# Patient Record
Sex: MALE | Race: WHITE | NOT HISPANIC OR LATINO | Employment: FULL TIME | URBAN - NONMETROPOLITAN AREA
[De-identification: names, ages, dates, MRNs, and addresses within clinical notes are randomized per-mention and may not be internally consistent; named-entity substitution may affect disease eponyms.]

---

## 2017-01-25 RX ORDER — DICLOFENAC SODIUM 25 MG/1
TABLET, DELAYED RELEASE ORAL
Qty: 60 TABLET | Refills: 0 | Status: SHIPPED | OUTPATIENT
Start: 2017-01-25 | End: 2017-03-09 | Stop reason: DRUGHIGH

## 2017-02-02 ENCOUNTER — OFFICE VISIT (OUTPATIENT)
Dept: FAMILY MEDICINE CLINIC | Facility: CLINIC | Age: 42
End: 2017-02-02

## 2017-02-02 ENCOUNTER — LAB (OUTPATIENT)
Dept: LAB | Facility: HOSPITAL | Age: 42
End: 2017-02-02

## 2017-02-02 VITALS
BODY MASS INDEX: 26.36 KG/M2 | HEIGHT: 69 IN | DIASTOLIC BLOOD PRESSURE: 92 MMHG | SYSTOLIC BLOOD PRESSURE: 130 MMHG | WEIGHT: 178 LBS

## 2017-02-02 DIAGNOSIS — R53.83 MALAISE AND FATIGUE: ICD-10-CM

## 2017-02-02 DIAGNOSIS — M25.40 JOINT SWELLING: ICD-10-CM

## 2017-02-02 DIAGNOSIS — M25.50 ARTHRALGIA, UNSPECIFIED JOINT: ICD-10-CM

## 2017-02-02 DIAGNOSIS — L40.9 PSORIASIS: ICD-10-CM

## 2017-02-02 DIAGNOSIS — R53.81 MALAISE AND FATIGUE: ICD-10-CM

## 2017-02-02 DIAGNOSIS — M25.50 ARTHRALGIA, UNSPECIFIED JOINT: Primary | ICD-10-CM

## 2017-02-02 LAB
CRP SERPL-MCNC: 1.4 MG/DL (ref 0–1)
ERYTHROCYTE [SEDIMENTATION RATE] IN BLOOD: 21 MM/HR (ref 0–20)
URATE SERPL-MCNC: 6.3 MG/DL (ref 2.5–8.5)

## 2017-02-02 PROCEDURE — 36415 COLL VENOUS BLD VENIPUNCTURE: CPT | Performed by: NURSE PRACTITIONER

## 2017-02-02 PROCEDURE — 86431 RHEUMATOID FACTOR QUANT: CPT | Performed by: NURSE PRACTITIONER

## 2017-02-02 PROCEDURE — 99213 OFFICE O/P EST LOW 20 MIN: CPT | Performed by: NURSE PRACTITIONER

## 2017-02-02 PROCEDURE — 86038 ANTINUCLEAR ANTIBODIES: CPT | Performed by: NURSE PRACTITIONER

## 2017-02-02 PROCEDURE — 86757 RICKETTSIA ANTIBODY: CPT | Performed by: NURSE PRACTITIONER

## 2017-02-02 PROCEDURE — 84550 ASSAY OF BLOOD/URIC ACID: CPT | Performed by: NURSE PRACTITIONER

## 2017-02-02 PROCEDURE — 85651 RBC SED RATE NONAUTOMATED: CPT | Performed by: NURSE PRACTITIONER

## 2017-02-02 PROCEDURE — 86140 C-REACTIVE PROTEIN: CPT | Performed by: NURSE PRACTITIONER

## 2017-02-02 RX ORDER — METHYLPREDNISOLONE 4 MG/1
TABLET ORAL
Qty: 21 TABLET | Refills: 0 | Status: SHIPPED | OUTPATIENT
Start: 2017-02-02 | End: 2017-03-09

## 2017-02-02 NOTE — PROGRESS NOTES
Chief Complaint   Patient presents with   • Knee Pain     bilateral knee pain x 1 week , swelling right knee   • Elbow Pain     bilateral elbow pain x 1 week     Subjective   Boubacar Baez is a 41 y.o. male.     Knee Pain    The incident occurred more than 1 week ago. There was no injury mechanism. The pain is present in the right knee. The quality of the pain is described as aching. The pain is at a severity of 10/10. The pain is severe. The pain has been constant since onset. Pertinent negatives include no inability to bear weight, loss of motion, loss of sensation, muscle weakness, numbness or tingling. He reports no foreign bodies present. Nothing aggravates the symptoms. He has tried acetaminophen for the symptoms. The treatment provided mild relief.   Elbow Pain   This is a new problem. The current episode started 1 to 4 weeks ago. The problem occurs constantly. The problem has been gradually worsening. Associated symptoms include arthralgias, fatigue, joint swelling, myalgias, neck pain and a rash. Pertinent negatives include no abdominal pain, anorexia, chest pain, chills, congestion, coughing, diaphoresis, fever, headaches, nausea, numbness, urinary symptoms, vertigo, visual change, vomiting or weakness. The symptoms are aggravated by walking, exertion and standing. He has tried acetaminophen for the symptoms. The treatment provided mild relief.        The following portions of the patient's history were reviewed and updated as appropriate: allergies, current medications, past social history and problem list.    Review of Systems   Constitutional: Positive for fatigue. Negative for chills, diaphoresis and fever.   HENT: Negative.  Negative for congestion.    Eyes: Negative.  Negative for pain, discharge, redness and itching.   Respiratory: Negative.  Negative for cough.    Cardiovascular: Negative.  Negative for chest pain.   Gastrointestinal: Negative.  Negative for abdominal pain, anorexia, nausea  "and vomiting.   Endocrine: Negative.    Genitourinary: Negative.    Musculoskeletal: Positive for arthralgias, back pain, gait problem, joint swelling, myalgias, neck pain and neck stiffness.   Skin: Positive for rash.        Rash to elbows and bilateral knees    Allergic/Immunologic: Negative.    Neurological: Negative for dizziness, vertigo, tingling, facial asymmetry, weakness, numbness and headaches.   Hematological: Negative.    Psychiatric/Behavioral: Negative.        Objective   Visit Vitals   • /92 (BP Location: Left arm, Patient Position: Sitting, Cuff Size: Adult)   • Ht 69\" (175.3 cm)   • Wt 178 lb (80.7 kg)   • BMI 26.29 kg/m2     Physical Exam   Constitutional: He is oriented to person, place, and time. He appears well-developed and well-nourished.   HENT:   Head: Normocephalic.   Eyes: EOM are normal. Pupils are equal, round, and reactive to light.   Neck: Normal range of motion.   Cardiovascular: Normal rate, regular rhythm and normal heart sounds.    Pulmonary/Chest: Effort normal and breath sounds normal.   Abdominal: Soft.   Genitourinary: Rectum normal and penis normal.   Musculoskeletal:        Right elbow: He exhibits swelling. Tenderness found.        Left elbow: He exhibits swelling. Tenderness found.        Right knee: He exhibits decreased range of motion, swelling and bony tenderness.        Left knee: He exhibits decreased range of motion, swelling and bony tenderness. Tenderness found.   Neurological: He is alert and oriented to person, place, and time.   Skin: Skin is warm, dry and intact. Rash noted. No abrasion, no bruising, no burn, no ecchymosis, no laceration, no lesion, no petechiae and no purpura noted. Rash is papular and maculopapular. Rash is not macular, not nodular, not pustular, not vesicular and not urticarial. He is not diaphoretic. No cyanosis or erythema. No pallor. Nails show no clubbing.   Rash to both knees red with plague    Psychiatric: He has a normal mood " and affect.       Assessment/Plan   Problem List Items Addressed This Visit        Nervous and Auditory    Arthralgia - Primary    Relevant Orders    MELANY    C-reactive Protein    Rheumatoid Factor, Quant    Uric Acid    Sedimentation Rate    Rheumatoid Factor    Lyme IgG / IgM Ab    [facility] (IgG / M)       Musculoskeletal and Integument    Psoriasis    Joint swelling    Relevant Orders    MELANY    C-reactive Protein    Rheumatoid Factor, Quant    Uric Acid    Sedimentation Rate    Rheumatoid Factor    Lyme IgG / IgM Ab    [facility] (IgG / M)       Other    Malaise and fatigue    Relevant Orders    MELANY    C-reactive Protein    Rheumatoid Factor, Quant    Uric Acid    Sedimentation Rate    Rheumatoid Factor    Lyme IgG / IgM Ab    [facility] (IgG / M)           New Medications Ordered This Visit   Medications   • MethylPREDNISolone (MEDROL, ROSALINO,) 4 MG tablet     Sig: Take as directed on package instructions.     Dispense:  21 tablet     Refill:  0   • diclofenac (VOLTAREN) 50 MG EC tablet     Sig: Take 1 tablet by mouth 3 (Three) Times a Day.     Dispense:  90 tablet     Refill:  5

## 2017-02-03 LAB — RHEUMATOID FACT SERPL-ACNC: NEGATIVE [IU]/ML

## 2017-02-04 LAB — RA LATEX TURBID: <10 IU/ML (ref 0–13.9)

## 2017-02-06 LAB — ANA SER QL: NEGATIVE

## 2017-02-08 LAB
R RICKETTSI IGG SER QL IA: ABNORMAL
R RICKETTSI IGM TITR SER: 0.86 INDEX (ref 0–0.89)
RICK SF IGG TITR SER IF: POSITIVE {TITER}

## 2017-02-09 ENCOUNTER — TELEPHONE (OUTPATIENT)
Dept: FAMILY MEDICINE CLINIC | Facility: CLINIC | Age: 42
End: 2017-02-09

## 2017-02-09 DIAGNOSIS — M25.59 PAIN IN JOINT, OTHER SPECIFIED SITES: ICD-10-CM

## 2017-02-09 DIAGNOSIS — L40.9 PSORIASIS: ICD-10-CM

## 2017-02-09 DIAGNOSIS — R53.81 MALAISE AND FATIGUE: Primary | ICD-10-CM

## 2017-02-09 DIAGNOSIS — M25.40 EFFUSION OF JOINT, SITE UNSPECIFIED: ICD-10-CM

## 2017-02-09 DIAGNOSIS — R53.83 MALAISE AND FATIGUE: Primary | ICD-10-CM

## 2017-02-09 RX ORDER — DOXYCYCLINE HYCLATE 100 MG/1
100 TABLET, DELAYED RELEASE ORAL 2 TIMES DAILY
Qty: 28 TABLET | Refills: 0 | Status: SHIPPED | OUTPATIENT
Start: 2017-02-09 | End: 2017-04-03 | Stop reason: SDUPTHER

## 2017-02-09 NOTE — PROGRESS NOTES
RESULTS & RECOMMENDATIONS RELAYED Pr ANDREW Ace's instruction  Mr. Baez was contacted and advised his RMSF test was Positive.  He states he had tested Positive in the past.   Based on his symptoms Miroslava feels he needs to be treated with Doxycyline 100 mg BID.  Referral was sent to Rheumatology in [facility].

## 2017-02-09 NOTE — TELEPHONE ENCOUNTER
ANDREW Bateman's instruction  Mr. Baez was contacted and advised his RMSF test was Positive.  He states he had tested Positive in the past.   Based on his symptoms Miroslava feels he needs to be treated with Doxycyline 100 mg BID.  Referral was sent to Rheumatology in [facility].     ---- Message from ANDREW Jones sent at 2/8/2017  4:40 PM CST -----  I cannot get a hold of this patient-can you see if you can reach him-needs to be treated with doxycycline 100mg bid x 14 days pos previous exposure to rmsf but going to treat based on his symptoms everything else looks good-I still want him to see rheumatologist

## 2017-03-09 ENCOUNTER — OFFICE VISIT (OUTPATIENT)
Dept: FAMILY MEDICINE CLINIC | Facility: CLINIC | Age: 42
End: 2017-03-09

## 2017-03-09 VITALS
WEIGHT: 165 LBS | HEIGHT: 69 IN | SYSTOLIC BLOOD PRESSURE: 152 MMHG | DIASTOLIC BLOOD PRESSURE: 100 MMHG | BODY MASS INDEX: 24.44 KG/M2

## 2017-03-09 DIAGNOSIS — M25.50 ARTHRALGIA, UNSPECIFIED JOINT: Primary | ICD-10-CM

## 2017-03-09 DIAGNOSIS — M25.562 PAIN IN BOTH KNEES, UNSPECIFIED CHRONICITY: ICD-10-CM

## 2017-03-09 DIAGNOSIS — M25.40 JOINT SWELLING: ICD-10-CM

## 2017-03-09 DIAGNOSIS — M25.561 PAIN IN BOTH KNEES, UNSPECIFIED CHRONICITY: ICD-10-CM

## 2017-03-09 PROCEDURE — 99214 OFFICE O/P EST MOD 30 MIN: CPT | Performed by: NURSE PRACTITIONER

## 2017-03-09 RX ORDER — METHYLPREDNISOLONE 4 MG/1
TABLET ORAL
Qty: 21 TABLET | Refills: 0 | Status: SHIPPED | OUTPATIENT
Start: 2017-03-09

## 2017-03-09 NOTE — PROGRESS NOTES
Chief Complaint   Patient presents with   • Follow-up     rmf    • Joint Swelling   • Knee Pain     Subjective   Boubacar Baez is a 41 y.o. male.     Joint Swelling   Associated symptoms include arthralgias, fatigue, joint swelling, myalgias, neck pain and a rash. Pertinent negatives include no abdominal pain, chest pain, chills, congestion, coughing, diaphoresis, fever, headaches, nausea, numbness, urinary symptoms, visual change, vomiting or weakness.   Knee Pain    The incident occurred more than 1 week ago. There was no injury mechanism. The pain is present in the right knee. The quality of the pain is described as aching. The pain is at a severity of 10/10. The pain is severe. The pain has been constant since onset. Pertinent negatives include no inability to bear weight, loss of motion, loss of sensation, muscle weakness, numbness or tingling. He reports no foreign bodies present. Nothing aggravates the symptoms. He has tried acetaminophen for the symptoms. The treatment provided mild relief.   Elbow Pain   This is a new problem. The current episode started 1 to 4 weeks ago. The problem occurs constantly. The problem has been gradually worsening. Associated symptoms include arthralgias, fatigue, joint swelling, myalgias, neck pain and a rash. Pertinent negatives include no abdominal pain, chest pain, chills, congestion, coughing, diaphoresis, fever, headaches, nausea, numbness, urinary symptoms, visual change, vomiting or weakness. The symptoms are aggravated by walking, exertion and standing. He has tried acetaminophen for the symptoms. The treatment provided mild relief.        The following portions of the patient's history were reviewed and updated as appropriate: allergies, current medications, past social history and problem list.    Review of Systems   Constitutional: Positive for fatigue. Negative for chills, diaphoresis and fever.   HENT: Negative.  Negative for congestion.    Eyes: Negative.   "Negative for pain, discharge, redness and itching.   Respiratory: Negative.  Negative for cough.    Cardiovascular: Negative.  Negative for chest pain.   Gastrointestinal: Negative.  Negative for abdominal pain, nausea and vomiting.   Endocrine: Negative.    Genitourinary: Negative.    Musculoskeletal: Positive for arthralgias, back pain, gait problem, joint swelling, myalgias, neck pain and neck stiffness.   Skin: Positive for rash.        Rash to elbows and bilateral knees    Allergic/Immunologic: Negative.    Neurological: Negative for dizziness, tingling, facial asymmetry, weakness, numbness and headaches.   Hematological: Negative.    Psychiatric/Behavioral: Negative.        Objective   Visit Vitals   • /100 (BP Location: Left arm, Patient Position: Sitting, Cuff Size: Adult)   • Ht 69\" (175.3 cm)   • Wt 165 lb (74.8 kg)   • BMI 24.37 kg/m2     Physical Exam   Constitutional: He is oriented to person, place, and time. He appears well-developed and well-nourished.   HENT:   Head: Normocephalic.   Eyes: EOM are normal. Pupils are equal, round, and reactive to light.   Neck: Normal range of motion.   Cardiovascular: Normal rate, regular rhythm and normal heart sounds.    Pulmonary/Chest: Effort normal and breath sounds normal.   Abdominal: Soft.   Genitourinary: Rectum normal and penis normal.   Musculoskeletal: He exhibits tenderness. He exhibits no edema or deformity.        Right shoulder: He exhibits decreased range of motion, tenderness, bony tenderness, swelling and crepitus.        Right elbow: He exhibits swelling. Tenderness found.        Left elbow: He exhibits swelling. Tenderness found.        Right knee: He exhibits decreased range of motion, swelling and bony tenderness.        Left knee: He exhibits decreased range of motion, swelling and bony tenderness. Tenderness found.   Neurological: He is alert and oriented to person, place, and time. He displays normal reflexes. No cranial nerve " deficit. He exhibits normal muscle tone. Coordination normal.   Skin: Skin is warm, dry and intact. Rash noted. No abrasion, no bruising, no burn, no ecchymosis, no laceration, no lesion, no petechiae and no purpura noted. Rash is papular and maculopapular. Rash is not macular, not nodular, not pustular, not vesicular and not urticarial. He is not diaphoretic. No cyanosis or erythema. No pallor. Nails show no clubbing.   Rash to both knees red with plague    Psychiatric: He has a normal mood and affect.       Assessment/Plan   Problem List Items Addressed This Visit        Nervous and Auditory    Arthralgia - Primary    Relevant Orders    XR Knee 4+ View Left    Ambulatory Referral to Orthopedic Surgery       Musculoskeletal and Integument    Joint swelling    Relevant Orders    XR Knee 4+ View Left    Ambulatory Referral to Orthopedic Surgery      Other Visit Diagnoses     Pain in both knees, unspecified chronicity        Relevant Orders    XR Knee 4+ View Left    XR Knee 4+ View Right    Ambulatory Referral to Orthopedic Surgery           New Medications Ordered This Visit   Medications   • MethylPREDNISolone (MEDROL, ROSALINO,) 4 MG tablet     Sig: Take as directed on package instructions.     Dispense:  21 tablet     Refill:  0

## 2017-03-10 DIAGNOSIS — M25.562 CHRONIC PAIN OF BOTH KNEES: Primary | ICD-10-CM

## 2017-03-10 DIAGNOSIS — M25.561 CHRONIC PAIN OF BOTH KNEES: Primary | ICD-10-CM

## 2017-03-10 DIAGNOSIS — G89.29 CHRONIC PAIN OF BOTH KNEES: Primary | ICD-10-CM

## 2017-03-27 ENCOUNTER — OFFICE VISIT (OUTPATIENT)
Dept: ORTHOPEDIC SURGERY | Facility: CLINIC | Age: 42
End: 2017-03-27

## 2017-03-27 VITALS — BODY MASS INDEX: 24.44 KG/M2 | WEIGHT: 165 LBS | HEIGHT: 69 IN

## 2017-03-27 DIAGNOSIS — G89.29 CHRONIC PAIN OF BOTH KNEES: Primary | ICD-10-CM

## 2017-03-27 DIAGNOSIS — M25.562 CHRONIC PAIN OF BOTH KNEES: Primary | ICD-10-CM

## 2017-03-27 DIAGNOSIS — L40.50 PSORIATIC ARTHRITIS (HCC): ICD-10-CM

## 2017-03-27 DIAGNOSIS — M25.561 CHRONIC PAIN OF BOTH KNEES: Primary | ICD-10-CM

## 2017-03-27 PROCEDURE — 99214 OFFICE O/P EST MOD 30 MIN: CPT | Performed by: NURSE PRACTITIONER

## 2017-03-27 NOTE — PROGRESS NOTES
Boubacar Baez is a 41 y.o. male   Primary provider:  ANDREW Jones       Chief Complaint   Patient presents with   • Left Knee - Establish Care, Pain   • Right Knee - Establish Care, Pain       HISTORY OF PRESENT ILLNESS:     Pain   This is a chronic problem. The current episode started more than 1 month ago. The problem occurs constantly. The problem has been unchanged. Associated symptoms include joint swelling and weakness. Associated symptoms comments: Sharp pain with dull ache, and locking. The symptoms are aggravated by bending, walking and twisting (squatting). He has tried immobilization, heat and ice for the symptoms. The treatment provided mild relief.        CONCURRENT MEDICAL HISTORY:    Past Medical History:   Diagnosis Date   • Pain in finger of left hand 07/20/2016   • Psoriasis 07/20/2016   • Wax in ear 07/20/2016       Allergies   Allergen Reactions   • Tylenol [Acetaminophen]          Current Outpatient Prescriptions:   •  betamethasone valerate (VALISONE) 0.1 % ointment, Apply  topically 2 (two) times a day., Disp: , Rfl:   •  diclofenac (VOLTAREN) 50 MG EC tablet, Take 1 tablet by mouth 3 (Three) Times a Day., Disp: 90 tablet, Rfl: 5  •  doxycycline (DORYX) 100 MG enteric coated tablet, Take 1 tablet by mouth 2 (Two) Times a Day., Disp: 28 tablet, Rfl: 0  •  MethylPREDNISolone (MEDROL, ROSALINO,) 4 MG tablet, Take as directed on package instructions., Disp: 21 tablet, Rfl: 0    No past surgical history on file.    Family History   Problem Relation Age of Onset   • Hypertension Other        Social History     Social History   • Marital status:      Spouse name: N/A   • Number of children: N/A   • Years of education: N/A     Occupational History   • Not on file.     Social History Main Topics   • Smoking status: Never Smoker   • Smokeless tobacco: Current User   • Alcohol use Yes      Comment: BEER   • Drug use: Not on file   • Sexual activity: Not on file     Other Topics Concern   •  "Not on file     Social History Narrative        Review of Systems   Constitutional: Positive for unexpected weight change.   Eyes: Positive for photophobia and visual disturbance.   Musculoskeletal: Positive for back pain and joint swelling.        Pain and stiffness in joints   Neurological: Positive for weakness.   All other systems reviewed and are negative.      PHYSICAL EXAMINATION:       Ht 69\" (175.3 cm)  Wt 165 lb (74.8 kg)  BMI 24.37 kg/m2    Physical Exam   Constitutional: He is oriented to person, place, and time. Vital signs are normal. He appears well-developed and well-nourished. He is cooperative.   HENT:   Head: Normocephalic and atraumatic.   Neck: Trachea normal and phonation normal.   Pulmonary/Chest: Effort normal. No respiratory distress.   Abdominal: Soft. Normal appearance. He exhibits no distension.   Musculoskeletal:        Right knee: He exhibits effusion.        Left knee: He exhibits effusion.   Neurological: He is alert and oriented to person, place, and time. GCS eye subscore is 4. GCS verbal subscore is 5. GCS motor subscore is 6.   Skin: Skin is warm, dry and intact.   Psychiatric: He has a normal mood and affect. His speech is normal and behavior is normal. Judgment and thought content normal. Cognition and memory are normal.   Vitals reviewed.      GAIT:     [x]  Normal  []  Antalgic    Assistive device: [x]  None  []  Walker     []  Crutches  []  Cane     []  Wheelchair  []  Stretcher    Right Knee Exam     Tenderness   The patient is experiencing tenderness in the medial joint line and lateral joint line.    Range of Motion   Extension: normal   Flexion: abnormal     Other   Erythema: absent  Scars: absent  Sensation: normal  Pulse: present  Swelling: mild  Other tests: effusion present      Left Knee Exam     Tenderness   The patient is experiencing tenderness in the medial joint line and lateral joint line.    Range of Motion   Extension: normal   Flexion: abnormal     Other "   Erythema: absent  Scars: absent  Sensation: normal  Pulse: present  Swelling: mild  Effusion: effusion present                  Xr Knee 4+ View Left    Result Date: 3/9/2017  Narrative: Patient Name:  SILVIO FERNANDEZ Patient ID:  [id number] Ordering:  CARMEL DIAZ Attending:  CARMEL DIAZ Referring:  CARMEL DIAZ ------------------------------------------------ Procedure:  Left knee.    Indication:  Left knee pain. Effusion   . Technique:  Left knee May 15, 2014.   . Prior relevant exam:  None.   No evidence of acute bony, soft tissue, or joint abnormality is noted. Bone mineralization is within normal limits. The visualized joint spaces appear intact.     Impression: CONCLUSION: 1.  Normal left knee.   Electronically signed by:  Justin Sosa MD  3/9/2017 9:13 AM [employer] Workstation: [id number]    Xr Knee 4+ View Right    Result Date: 3/9/2017  Narrative: Patient Name:  SILVIO FERNANDEZ Patient ID:  [id number] Ordering:  CARMEL DIAZ Attending:  CARMEL DIAZ Referring:  CARMEL DIAZ ------------------------------------------------ Procedure:  Right knee    Indication:  Right knee pain.   . Technique:  Four views   . Prior relevant exam:  None.   No evidence of acute bony, soft tissue, or joint abnormality is noted. Bone mineralization is within normal limits. The visualized joint spaces appear intact. Focal sclerotic lesion proximal tibia, incidental benign bone island.     Impression: CONCLUSION: 1.  Normal right knee.   Electronically signed by:  Justin Sosa MD  3/9/2017 9:12 AM [employer] Workstation: [id number]          ASSESSMENT:    Diagnoses and all orders for this visit:    Chronic pain of both knees    Psoriatic arthritis          PLAN  I have resent referral to rheumatology today and recommended follow-up with a rheumatologist for further evaluation of his psoriatic arthritis and recent diagnosis RMSF.      No Follow-up on file.    Lennox Rosario, APRN

## 2017-03-28 PROBLEM — L40.9 PSORIASIS: Status: RESOLVED | Noted: 2017-02-02 | Resolved: 2017-03-28

## 2017-03-30 ENCOUNTER — OFFICE VISIT (OUTPATIENT)
Dept: FAMILY MEDICINE CLINIC | Facility: CLINIC | Age: 42
End: 2017-03-30

## 2017-03-30 VITALS
OXYGEN SATURATION: 99 % | HEART RATE: 95 BPM | WEIGHT: 165.4 LBS | DIASTOLIC BLOOD PRESSURE: 100 MMHG | TEMPERATURE: 97.8 F | SYSTOLIC BLOOD PRESSURE: 144 MMHG | HEIGHT: 68 IN | BODY MASS INDEX: 25.07 KG/M2

## 2017-03-30 DIAGNOSIS — A77.0 ROCKY MOUNTAIN SPOTTED FEVER: ICD-10-CM

## 2017-03-30 DIAGNOSIS — L40.50 PSORIATIC ARTHRITIS (HCC): Primary | ICD-10-CM

## 2017-03-30 DIAGNOSIS — J30.2 SEASONAL ALLERGIC RHINITIS, UNSPECIFIED ALLERGIC RHINITIS TRIGGER: ICD-10-CM

## 2017-03-30 LAB
ALBUMIN SERPL-MCNC: 4.3 G/DL (ref 3.4–4.8)
ALBUMIN/GLOB SERPL: 1.5 G/DL (ref 1.1–1.8)
ALP SERPL-CCNC: 36 U/L (ref 38–126)
ALT SERPL W P-5'-P-CCNC: 33 U/L (ref 21–72)
ANION GAP SERPL CALCULATED.3IONS-SCNC: 14 MMOL/L (ref 5–15)
ARTICHOKE IGE QN: 112 MG/DL (ref 1–129)
AST SERPL-CCNC: 23 U/L (ref 17–59)
BILIRUB SERPL-MCNC: 0.7 MG/DL (ref 0.2–1.3)
BUN BLD-MCNC: 11 MG/DL (ref 7–21)
BUN/CREAT SERPL: 13.3 (ref 7–25)
CALCIUM SPEC-SCNC: 9.7 MG/DL (ref 8.4–10.2)
CHLORIDE SERPL-SCNC: 102 MMOL/L (ref 95–110)
CHOLEST SERPL-MCNC: 197 MG/DL (ref 0–199)
CO2 SERPL-SCNC: 27 MMOL/L (ref 22–31)
CREAT BLD-MCNC: 0.83 MG/DL (ref 0.7–1.3)
DEPRECATED RDW RBC AUTO: 42.7 FL (ref 35.1–43.9)
ERYTHROCYTE [DISTWIDTH] IN BLOOD BY AUTOMATED COUNT: 12.7 % (ref 11.5–14.5)
GFR SERPL CREATININE-BSD FRML MDRD: 102 ML/MIN/1.73 (ref 63–147)
GLOBULIN UR ELPH-MCNC: 2.8 GM/DL (ref 2.3–3.5)
GLUCOSE BLD-MCNC: 102 MG/DL (ref 60–100)
HCT VFR BLD AUTO: 43.4 % (ref 39–49)
HDLC SERPL-MCNC: 59 MG/DL (ref 60–200)
HGB BLD-MCNC: 15.3 G/DL (ref 13.7–17.3)
LDLC/HDLC SERPL: 2.04 {RATIO} (ref 0–3.55)
MCH RBC QN AUTO: 32.1 PG (ref 26.5–34)
MCHC RBC AUTO-ENTMCNC: 35.3 G/DL (ref 31.5–36.3)
MCV RBC AUTO: 91.2 FL (ref 80–98)
PLATELET # BLD AUTO: 343 10*3/MM3 (ref 150–450)
PMV BLD AUTO: 10.5 FL (ref 8–12)
POTASSIUM BLD-SCNC: 5.1 MMOL/L (ref 3.5–5.1)
PROT SERPL-MCNC: 7.1 G/DL (ref 6.3–8.6)
RBC # BLD AUTO: 4.76 10*6/MM3 (ref 4.37–5.74)
SODIUM BLD-SCNC: 143 MMOL/L (ref 137–145)
TRIGL SERPL-MCNC: 88 MG/DL (ref 20–199)
WBC NRBC COR # BLD: 11.13 10*3/MM3 (ref 3.2–9.8)

## 2017-03-30 PROCEDURE — 85027 COMPLETE CBC AUTOMATED: CPT | Performed by: FAMILY MEDICINE

## 2017-03-30 PROCEDURE — 80061 LIPID PANEL: CPT | Performed by: FAMILY MEDICINE

## 2017-03-30 PROCEDURE — 99214 OFFICE O/P EST MOD 30 MIN: CPT | Performed by: FAMILY MEDICINE

## 2017-03-30 PROCEDURE — 80053 COMPREHEN METABOLIC PANEL: CPT | Performed by: FAMILY MEDICINE

## 2017-03-30 RX ORDER — FAMOTIDINE 40 MG/1
40 TABLET, FILM COATED ORAL DAILY
Qty: 90 TABLET | Refills: 3 | Status: SHIPPED | OUTPATIENT
Start: 2017-03-30

## 2017-03-30 RX ORDER — PREDNISONE 20 MG/1
TABLET ORAL
Qty: 63 TABLET | Refills: 0 | Status: SHIPPED | OUTPATIENT
Start: 2017-03-30

## 2017-03-30 RX ORDER — LORATADINE 10 MG/1
10 TABLET ORAL DAILY
Qty: 30 TABLET | Refills: 11 | Status: SHIPPED | OUTPATIENT
Start: 2017-03-30

## 2017-03-30 RX ORDER — INDOMETHACIN 50 MG/1
50 CAPSULE ORAL 3 TIMES DAILY PRN
Qty: 90 CAPSULE | Refills: 0 | Status: SHIPPED | OUTPATIENT
Start: 2017-03-30

## 2017-03-30 RX ORDER — OMEPRAZOLE 20 MG/1
20 CAPSULE, DELAYED RELEASE ORAL DAILY
Qty: 30 CAPSULE | Refills: 11 | Status: SHIPPED | OUTPATIENT
Start: 2017-03-30

## 2017-03-30 NOTE — PROGRESS NOTES
Subjective   Boubacar Baez is a 41 y.o. male.     History of Present Illness     Since febrauary cherise mountain spotted fever and psoriatic arthritis.  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  Had a shot of steroid early on that helped his psoriasis and his joint pain. Had 14 days of doxycycline.   diclofenace not helping, he doubled the dose and didn't help  He drinks 3 beers each night.  His pcp leticia palafox who discovered the cherise mountain spotted fever and then saw venkata anderson who gave dx of psoriatic arthritis.    His knees and elbows are affected.  Nothing has helped pain.  He was told he was to see a rheumatologist but hasn't heard anything.    Review of Systems   Constitutional: Positive for fatigue and fever. Negative for chills.   HENT: Positive for postnasal drip. Negative for congestion, ear discharge, ear pain, facial swelling, hearing loss, rhinorrhea, sinus pressure, sore throat, trouble swallowing and voice change.    Eyes: Negative for discharge, redness and visual disturbance.   Respiratory: Negative for cough, chest tightness, shortness of breath and wheezing.    Cardiovascular: Negative for chest pain and palpitations.   Gastrointestinal: Negative for abdominal pain, blood in stool, constipation, diarrhea, nausea and vomiting.   Endocrine: Negative for polydipsia and polyuria.   Genitourinary: Negative for dysuria, flank pain, hematuria and urgency.   Musculoskeletal: Positive for joint swelling and myalgias. Negative for arthralgias and back pain.   Skin: Negative for rash.   Neurological: Positive for dizziness and weakness. Negative for numbness and headaches.   Hematological: Negative for adenopathy.   Psychiatric/Behavioral: Negative for confusion and sleep disturbance. The patient is not nervous/anxious.        Objective   Physical Exam   Constitutional: He is oriented to person, place, and time. He appears well-developed and well-nourished.   HENT:   Head: Normocephalic and  atraumatic.   Right Ear: External ear normal.   Left Ear: External ear normal.   Nose: Nose normal.   Eyes: Conjunctivae and EOM are normal. Pupils are equal, round, and reactive to light.   Neck: Normal range of motion.   Pulmonary/Chest: Effort normal.   Musculoskeletal: Normal range of motion.   Neurological: He is alert and oriented to person, place, and time.   Skin:   [place] knees/elbows.   Psychiatric: He has a normal mood and affect. His behavior is normal. Judgment and thought content normal.   Nursing note and vitals reviewed.      Assessment/Plan   Boubacar was seen today for establish care, knee pain and elbow pain.    Diagnoses and all orders for this visit:    Psoriatic arthritis  -     CBC (No Diff)  -     Comprehensive Metabolic Panel  -     Lipid Panel  -     Ambulatory Referral to Rheumatology  -     indomethacin (INDOCIN) 50 MG capsule; Take 1 capsule by mouth 3 (Three) Times a Day As Needed for Mild Pain (1-3).    Tra Mountain spotted fever  -     CBC (No Diff)  -     Comprehensive Metabolic Panel  -     Lipid Panel  -     Ambulatory Referral to Rheumatology  -     indomethacin (INDOCIN) 50 MG capsule; Take 1 capsule by mouth 3 (Three) Times a Day As Needed for Mild Pain (1-3).    Seasonal allergic rhinitis, unspecified allergic rhinitis trigger  -     loratadine (CLARITIN) 10 MG tablet; Take 1 tablet by mouth Daily.    Other orders  -     predniSONE (DELTASONE) 20 MG tablet; 4 daily 6 days, 3 daily 6 days, 2 daily 6 days, 1 daily 6 days, 1/2 daily 6 days.  -     omeprazole (PRILOSEC) 20 MG capsule; Take 1 capsule by mouth Daily.  -     famotidine (PEPCID) 40 MG tablet; Take 1 tablet by mouth Daily.      i asked him to stop drinking any alcohol.    meds above and referral to rheumatologist  Return 1 week.  Baseline labs for future need of monitoring liver, etc due to possible future treatment.  Told steroid treatment could convert his psoriasis to pustular but since he got relief worth the  risk.   Food with meds above!  Strongly warned of stomach upset/ulcers.

## 2017-04-03 RX ORDER — DOXYCYCLINE HYCLATE 100 MG/1
100 TABLET, DELAYED RELEASE ORAL 2 TIMES DAILY
Qty: 42 TABLET | Refills: 0 | Status: SHIPPED | OUTPATIENT
Start: 2017-04-03